# Patient Record
Sex: FEMALE | NOT HISPANIC OR LATINO | Employment: FULL TIME | URBAN - METROPOLITAN AREA
[De-identification: names, ages, dates, MRNs, and addresses within clinical notes are randomized per-mention and may not be internally consistent; named-entity substitution may affect disease eponyms.]

---

## 2021-04-18 ENCOUNTER — OFFICE VISIT (OUTPATIENT)
Dept: URGENT CARE | Age: 61
End: 2021-04-18
Payer: COMMERCIAL

## 2021-04-18 ENCOUNTER — APPOINTMENT (OUTPATIENT)
Dept: RADIOLOGY | Age: 61
End: 2021-04-18
Payer: COMMERCIAL

## 2021-04-18 ENCOUNTER — HOSPITAL ENCOUNTER (EMERGENCY)
Facility: HOSPITAL | Age: 61
Discharge: HOME | End: 2021-04-18
Attending: EMERGENCY MEDICINE | Admitting: EMERGENCY MEDICINE
Payer: COMMERCIAL

## 2021-04-18 VITALS
HEART RATE: 85 BPM | WEIGHT: 141.31 LBS | RESPIRATION RATE: 18 BRPM | TEMPERATURE: 98.5 F | BODY MASS INDEX: 29.53 KG/M2 | DIASTOLIC BLOOD PRESSURE: 84 MMHG | OXYGEN SATURATION: 99 % | SYSTOLIC BLOOD PRESSURE: 172 MMHG

## 2021-04-18 VITALS
DIASTOLIC BLOOD PRESSURE: 78 MMHG | TEMPERATURE: 96.9 F | SYSTOLIC BLOOD PRESSURE: 155 MMHG | HEIGHT: 58 IN | WEIGHT: 137 LBS | OXYGEN SATURATION: 99 % | RESPIRATION RATE: 18 BRPM | HEART RATE: 76 BPM | BODY MASS INDEX: 28.76 KG/M2

## 2021-04-18 DIAGNOSIS — S42.232A CLOSED 3-PART FRACTURE OF SURGICAL NECK OF LEFT HUMERUS, INITIAL ENCOUNTER: Primary | ICD-10-CM
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
## 2021-04-18 DIAGNOSIS — S42.293A HUMERAL HEAD FRACTURE: Primary | ICD-10-CM

## 2021-04-18 PROCEDURE — 73060 X-RAY EXAM OF HUMERUS: CPT

## 2021-04-18 PROCEDURE — G0382 LEV 3 HOSP TYPE B ED VISIT: HCPCS | Performed by: PHYSICIAN ASSISTANT

## 2021-04-18 PROCEDURE — 99283 EMERGENCY DEPT VISIT LOW MDM: CPT

## 2021-04-18 PROCEDURE — 99284 EMERGENCY DEPT VISIT MOD MDM: CPT | Performed by: EMERGENCY MEDICINE

## 2021-04-18 RX ORDER — IBUPROFEN 600 MG/1
600 TABLET ORAL ONCE
Status: COMPLETED | OUTPATIENT
Start: 2021-04-18 | End: 2021-04-18

## 2021-04-18 RX ORDER — ACETAMINOPHEN 325 MG/1
650 TABLET ORAL EVERY 6 HOURS PRN
Status: SHIPPED | OUTPATIENT
Start: 2021-04-18

## 2021-04-18 RX ADMIN — IBUPROFEN 600 MG: 600 TABLET, FILM COATED ORAL at 16:27

## 2021-04-18 NOTE — PROGRESS NOTES
[facility]        NAME: Alecia Garcia is a 59 y o  female  : 1960    MRN: [id number]  DATE: 2021  TIME: 1:04 PM    Assessment and Plan   Closed 3-part fracture of surgical neck of left humerus, initial encounter [S42 232A]  1  Closed 3-part fracture of surgical neck of left humerus, initial encounter  Transfer to other facility    acetaminophen (TYLENOL) tablet 650 mg   2  Fall, initial encounter  XR humerus left   Patient placed in sling and given Tylenol in the office  Patient is instructed to report the emergency room for additional evaluation, ortho consult, and pain control  Patient Instructions    report to the emergency room for additional evaluation  Chief Complaint     Chief Complaint   Patient presents with    Arm Pain     Patient c/o R arm pain after she fell and hit it on the ground this morning  History of Present Illness         75-year-old female presents with complaint of left upper arm pain since just prior to arrival    Patient reports she and her  were hiking when she tripped and fell onto her left arm  Patient reports sharp stabbing pain with movement rated as a 10/10  Patient has 9 and 10 pain that is aching in nature at rest   She is not taken anything for her pain at this time  Pt reports that she did not hit her head and did not have LOC  Pt notes tingling of the hand and fingers, denies numbness and hand weakness  Denies N/V/D  Denies history of osteoporosis and osteopenia  No other concerns or complaints today  Review of Systems   Review of Systems   Gastrointestinal: Negative for diarrhea, nausea and vomiting  Musculoskeletal: Positive for arthralgias ( left shoulder) and myalgias  Neurological: Negative for dizziness, syncope, weakness, light-headedness, numbness and headaches  Current Medications     No current outpatient medications on file      Current Facility-Administered Medications:     acetaminophen (TYLENOL) tablet 650 mg, 650 mg, Oral, Q6H PRN, Leonora Parisi PA-C    Current Allergies     Allergies as of 04/18/2021    (No Known Allergies)            The following portions of the patient's history were reviewed and updated as appropriate: allergies, current medications, past family history, past medical history, past social history, past surgical history and problem list      No past medical history on file  No past surgical history on file  No family history on file  Medications have been verified  Objective   /78   Pulse 76   Temp (!) 96 9 °F (36 1 °C)   Resp 18   Ht 4' 10" (1 473 m)   Wt 62 1 kg (137 lb)   SpO2 99%   BMI 28 63 kg/m²   No LMP recorded  Physical Exam     Physical Exam  Vitals signs and nursing note reviewed  Constitutional:       General: She is awake  She is not in acute distress  Appearance: Normal appearance  She is well-developed and well-groomed  She is not ill-appearing, toxic-appearing or diaphoretic  HENT:      Head: Normocephalic and atraumatic  Right Ear: Hearing and external ear normal       Left Ear: Hearing and external ear normal    Eyes:      General: Lids are normal  Vision grossly intact  Gaze aligned appropriately  Extraocular Movements: Extraocular movements intact  Conjunctiva/sclera: Conjunctivae normal    Neck:      Musculoskeletal: Normal range of motion  Musculoskeletal:      Comments:  Asymmetry of the shoulders noted patient is sitting in guarded position holding on to the left arm  Full range of motion about the wrist and hand 5/5  strength  Sensation grossly intact to all digits  Capillary refill less than 2 seconds  Radial ulnar pulses 2+ and brisk  Left hand does appear slightly pale compared to the contralateral side  Neurological:      Mental Status: She is alert and oriented to person, place, and time  Coordination: Coordination is intact  Gait: Gait is intact  Psychiatric:         Attention and Perception: Attention and perception normal          Mood and Affect: Mood and affect normal          Speech: Speech normal          Behavior: Behavior normal  Behavior is cooperative  radiology:   Four view x-ray of the humerus demonstrates 3 prior humeral head fracture through the surgical neck and greater tuberosity with mild displacement of the greater tuberosity noted best seen on lateral film  No fractures or dislocations noted to the distal humerus

## 2021-04-18 NOTE — ED PROVIDER NOTES
Pt Name: Jazmin Bautista  MRN: [id number]  [place] 1960  Age/Sex: 59 y o  female  Date of evaluation: 4/18/2021  PCP: No primary care provider on file  CHIEF COMPLAINT    Chief Complaint   Patient presents with    Arm Injury     Fell onto L arm today  Denies hitting head, LOC  Seen at care now and referred to ED for humerus fracture  Sling on extremity          HPI    Seda Aceves presents to the Emergency Department complaining of left shoulder pain  She fell and was seen at urgent care  Diagnosed with humeral head fracture and DC with sling  Patient's pain is controlled with tylenol  She understands that she will need ortho follow up  HPI      Past Medical and Surgical History    History reviewed  No pertinent past medical history  History reviewed  No pertinent surgical history  History reviewed  No pertinent family history  Social History     Tobacco Use    Smoking status: Never Smoker    Smokeless tobacco: Never Used   Substance Use Topics    Alcohol use: Never     Frequency: Never    Drug use: Never           Allergies    No Known Allergies    Home Medications    Prior to Admission medications    Not on File           Review of Systems    Review of Systems   Constitutional: Negative for chills and fever  HENT: Negative for ear pain and sore throat  Eyes: Negative for pain and visual disturbance  Respiratory: Negative for cough and shortness of breath  Cardiovascular: Negative for chest pain and palpitations  Gastrointestinal: Negative for abdominal pain and vomiting  Genitourinary: Negative for dysuria and hematuria  Musculoskeletal: Negative for arthralgias and back pain  Skin: Negative for color change and rash  Neurological: Negative for seizures and syncope  All other systems reviewed and are negative        Physical Exam      ED Triage Vitals [04/18/21 1330]   Temperature Pulse Respirations Blood Pressure SpO2   98 5 °F (36 9 °C) 85 18 (!) 172/84 99 % Temp Source Heart Rate Source Patient Position - Orthostatic VS BP Location FiO2 (%)   Oral Monitor Sitting Right arm --      Pain Score       Worst Possible Pain               Physical Exam  Vitals signs and nursing note reviewed  Constitutional:       General: She is not in acute distress  Appearance: She is well-developed  HENT:      Head: Normocephalic and atraumatic  Eyes:      Conjunctiva/sclera: Conjunctivae normal    Neck:      Musculoskeletal: Neck supple  Cardiovascular:      Rate and Rhythm: Normal rate and regular rhythm  Heart sounds: No murmur  Pulmonary:      Effort: Pulmonary effort is normal  No respiratory distress  Breath sounds: Normal breath sounds  Abdominal:      Palpations: Abdomen is soft  Tenderness: There is no abdominal tenderness  Musculoskeletal:      Left shoulder: She exhibits decreased range of motion, tenderness and bony tenderness  Comments: Patient in left sling  NV intact distally  Skin:     General: Skin is warm and dry  Neurological:      Mental Status: She is alert  Assessment and Plan    Seth Damon is a 59 y o  female who presents with injury from a fall  Physical examination remarkable for same  Patient has had imaging  Her pain is controlled  She was offered additional pain meds and declined  She understands she will need ortho follow up  MDM    Diagnostic Results      Labs:    Procedure    Procedures      ED Course of Care and Re-Assessments    Reviewed x-ray from urgent care        Medications   ibuprofen (MOTRIN) tablet 600 mg (600 mg Oral Given 4/18/21 1627)           FINAL IMPRESSION    Final diagnoses:   Humeral head fracture         DISPOSITION/PLAN    Time reflects when diagnosis was documented in both MDM as applicable and the Disposition within this note     Time User Action Codes Description Comment    4/18/2021  4:23 PM Yara Shore Add [A95 221A] Humeral head fracture       ED Disposition     ED Disposition Condition Date/Time Comment    Discharge Stable Sun Apr 18, 2021  4:22 PM Davin Ball discharge to home/self care  Follow-up Information     Follow up With Specialties Details Why Contact Info Additional [address] [facility] [place] [facility] Schedule an appointment as soon as possible for a visit   [address]  [place] [address] [facility] [id number] [phone] [address] [facility] [place], [address], [address] [facility], [place], [address] [phone]            PATIENT REFERRED TO:    [address] [facility] [place]  [address]  [place] [address] [facility] [address] [phone]  Schedule an appointment as soon as possible for a visit         DISCHARGE MEDICATIONS:    There are no discharge medications for this patient  No discharge procedures on file           Yoselin Livingston, [address] [place] [address],   04/19/21 0377